# Patient Record
Sex: MALE | Race: OTHER | Employment: FULL TIME | URBAN - METROPOLITAN AREA
[De-identification: names, ages, dates, MRNs, and addresses within clinical notes are randomized per-mention and may not be internally consistent; named-entity substitution may affect disease eponyms.]

---

## 2018-10-17 ENCOUNTER — APPOINTMENT (OUTPATIENT)
Dept: CT IMAGING | Age: 43
End: 2018-10-17
Attending: EMERGENCY MEDICINE
Payer: SELF-PAY

## 2018-10-17 ENCOUNTER — HOSPITAL ENCOUNTER (EMERGENCY)
Age: 43
Discharge: HOME | End: 2018-10-18
Attending: EMERGENCY MEDICINE
Payer: SELF-PAY

## 2018-10-17 DIAGNOSIS — R10.9 ACUTE ABDOMINAL PAIN: Primary | ICD-10-CM

## 2018-10-17 DIAGNOSIS — K42.9 UMBILICAL HERNIA WITHOUT OBSTRUCTION AND WITHOUT GANGRENE: ICD-10-CM

## 2018-10-17 LAB
ALBUMIN SERPL-MCNC: 4 G/DL (ref 3.5–5)
ALBUMIN/GLOB SERPL: 1 {RATIO} (ref 1.2–3.5)
ALP SERPL-CCNC: 102 U/L (ref 50–136)
ALT SERPL-CCNC: 111 U/L (ref 12–65)
ANION GAP SERPL CALC-SCNC: 8 MMOL/L (ref 7–16)
AST SERPL-CCNC: 58 U/L (ref 15–37)
BASOPHILS # BLD: 0.1 K/UL (ref 0–0.2)
BASOPHILS NFR BLD: 1 % (ref 0–2)
BILIRUB SERPL-MCNC: 0.2 MG/DL (ref 0.2–1.1)
BUN SERPL-MCNC: 16 MG/DL (ref 6–23)
CALCIUM SERPL-MCNC: 8.7 MG/DL (ref 8.3–10.4)
CHLORIDE SERPL-SCNC: 108 MMOL/L (ref 98–107)
CO2 SERPL-SCNC: 27 MMOL/L (ref 21–32)
CREAT SERPL-MCNC: 1.07 MG/DL (ref 0.8–1.5)
DIFFERENTIAL METHOD BLD: ABNORMAL
EOSINOPHIL # BLD: 1.5 K/UL (ref 0–0.8)
EOSINOPHIL NFR BLD: 17 % (ref 0.5–7.8)
ERYTHROCYTE [DISTWIDTH] IN BLOOD BY AUTOMATED COUNT: 13 %
GLOBULIN SER CALC-MCNC: 4.1 G/DL (ref 2.3–3.5)
GLUCOSE SERPL-MCNC: 98 MG/DL (ref 65–100)
HCT VFR BLD AUTO: 45 % (ref 41.1–50.3)
HGB BLD-MCNC: 15 G/DL (ref 13.6–17.2)
IMM GRANULOCYTES # BLD: 0 K/UL (ref 0–0.5)
IMM GRANULOCYTES NFR BLD AUTO: 0 % (ref 0–5)
LYMPHOCYTES # BLD: 3.1 K/UL (ref 0.5–4.6)
LYMPHOCYTES NFR BLD: 34 % (ref 13–44)
MCH RBC QN AUTO: 29.2 PG (ref 26.1–32.9)
MCHC RBC AUTO-ENTMCNC: 33.3 G/DL (ref 31.4–35)
MCV RBC AUTO: 87.5 FL (ref 79.6–97.8)
MONOCYTES # BLD: 0.7 K/UL (ref 0.1–1.3)
MONOCYTES NFR BLD: 7 % (ref 4–12)
NEUTS SEG # BLD: 3.7 K/UL (ref 1.7–8.2)
NEUTS SEG NFR BLD: 41 % (ref 43–78)
NRBC # BLD: 0 K/UL (ref 0–0.2)
PLATELET # BLD AUTO: 188 K/UL (ref 150–450)
PMV BLD AUTO: 10.4 FL (ref 9.4–12.3)
POTASSIUM SERPL-SCNC: 4 MMOL/L (ref 3.5–5.1)
PROT SERPL-MCNC: 8.1 G/DL (ref 6.3–8.2)
RBC # BLD AUTO: 5.14 M/UL (ref 4.23–5.6)
SODIUM SERPL-SCNC: 143 MMOL/L (ref 136–145)
WBC # BLD AUTO: 9 K/UL (ref 4.3–11.1)

## 2018-10-17 PROCEDURE — 80053 COMPREHEN METABOLIC PANEL: CPT

## 2018-10-17 PROCEDURE — 85025 COMPLETE CBC W/AUTO DIFF WBC: CPT

## 2018-10-17 PROCEDURE — 96375 TX/PRO/DX INJ NEW DRUG ADDON: CPT | Performed by: EMERGENCY MEDICINE

## 2018-10-17 PROCEDURE — 74011250636 HC RX REV CODE- 250/636: Performed by: EMERGENCY MEDICINE

## 2018-10-17 PROCEDURE — 74177 CT ABD & PELVIS W/CONTRAST: CPT

## 2018-10-17 PROCEDURE — 99283 EMERGENCY DEPT VISIT LOW MDM: CPT | Performed by: EMERGENCY MEDICINE

## 2018-10-17 PROCEDURE — 96374 THER/PROPH/DIAG INJ IV PUSH: CPT | Performed by: EMERGENCY MEDICINE

## 2018-10-17 RX ORDER — MORPHINE SULFATE 4 MG/ML
4 INJECTION INTRAVENOUS
Status: COMPLETED | OUTPATIENT
Start: 2018-10-17 | End: 2018-10-17

## 2018-10-17 RX ORDER — SODIUM CHLORIDE 0.9 % (FLUSH) 0.9 %
10 SYRINGE (ML) INJECTION
Status: COMPLETED | OUTPATIENT
Start: 2018-10-17 | End: 2018-10-18

## 2018-10-17 RX ORDER — ONDANSETRON 2 MG/ML
4 INJECTION INTRAMUSCULAR; INTRAVENOUS
Status: COMPLETED | OUTPATIENT
Start: 2018-10-17 | End: 2018-10-17

## 2018-10-17 RX ADMIN — ONDANSETRON 4 MG: 2 INJECTION INTRAMUSCULAR; INTRAVENOUS at 23:56

## 2018-10-17 RX ADMIN — MORPHINE SULFATE 4 MG: 4 INJECTION INTRAVENOUS at 23:56

## 2018-10-18 VITALS
RESPIRATION RATE: 18 BRPM | DIASTOLIC BLOOD PRESSURE: 89 MMHG | BODY MASS INDEX: 37.2 KG/M2 | WEIGHT: 237 LBS | HEART RATE: 65 BPM | HEIGHT: 67 IN | TEMPERATURE: 98.2 F | SYSTOLIC BLOOD PRESSURE: 127 MMHG | OXYGEN SATURATION: 100 %

## 2018-10-18 PROCEDURE — 74011000258 HC RX REV CODE- 258: Performed by: EMERGENCY MEDICINE

## 2018-10-18 PROCEDURE — 74011636320 HC RX REV CODE- 636/320: Performed by: EMERGENCY MEDICINE

## 2018-10-18 PROCEDURE — 74011250637 HC RX REV CODE- 250/637

## 2018-10-18 PROCEDURE — 74011250637 HC RX REV CODE- 250/637: Performed by: EMERGENCY MEDICINE

## 2018-10-18 RX ORDER — NAPROXEN SODIUM 550 MG/1
550 TABLET ORAL 2 TIMES DAILY WITH MEALS
Qty: 10 TAB | Refills: 0 | Status: SHIPPED | OUTPATIENT
Start: 2018-10-18 | End: 2018-10-30

## 2018-10-18 RX ORDER — HYDROCODONE BITARTRATE AND ACETAMINOPHEN 10; 325 MG/1; MG/1
1 TABLET ORAL
Status: COMPLETED | OUTPATIENT
Start: 2018-10-18 | End: 2018-10-18

## 2018-10-18 RX ORDER — HYDROCODONE BITARTRATE AND ACETAMINOPHEN 10; 325 MG/1; MG/1
TABLET ORAL
Status: COMPLETED
Start: 2018-10-18 | End: 2018-10-18

## 2018-10-18 RX ADMIN — SODIUM CHLORIDE 100 ML: 900 INJECTION, SOLUTION INTRAVENOUS at 00:26

## 2018-10-18 RX ADMIN — IOPAMIDOL 100 ML: 755 INJECTION, SOLUTION INTRAVENOUS at 00:26

## 2018-10-18 RX ADMIN — HYDROCODONE BITARTRATE AND ACETAMINOPHEN 1 TABLET: 10; 325 TABLET ORAL at 01:21

## 2018-10-18 RX ADMIN — Medication 10 ML: at 00:26

## 2018-10-18 NOTE — ED TRIAGE NOTES
C/o pain located to umbilicus and lower abdomen. Onset approx 1 week pta. Denies n/v/d. Denies fever or chills. Denies urinary symptoms. States normal bm. Swelling noted to umbilicus. 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 Denies hx of same. Denies attempting pain meds pta.

## 2018-10-18 NOTE — ED NOTES
I have reviewed discharge instructions with the patient. The patient verbalized understanding. Patient left ED via Discharge Method: ambulatory to Home with wife. Opportunity for questions and clarification provided. Patient given 1 scripts. To continue your aftercare when you leave the hospital, you may receive an automated call from our care team to check in on how you are doing. This is a free service and part of our promise to provide the best care and service to meet your aftercare needs.  If you have questions, or wish to unsubscribe from this service please call [phone]. Thank you for Choosing our [facility] Emergency Department.

## 2018-10-18 NOTE — DISCHARGE INSTRUCTIONS
Call for appointment with primary care doctor. Recheck for persistent vomiting, fever or if the pain does not improve after 48 hours. Dolor abdominal: Instrucciones de cuidado - [ Abdominal Pain: Care Instructions ]  Instrucciones de cuidado    El dolor abdominal tiene muchas causas posibles. Algunas de ellas no son graves y mejoran por sí solas en unos días. Otras requieren Celina [place] y [place]. Si dean dolor continúa o [place], necesitará fernando nueva revisión y [place] pruebas para determinar qué pasa. Es posible que necesite cirugía para corregir el problema. No ignore nuevos síntomas, michelle fiebre, náuseas y [place], [address] [facility], dolor que JER[place] o [place]. Podrían ser señales de un problema más grave. Dean médico puede haberle recomendado fernando consulta de Shaheed Mendozan las 8 o 12 horas siguientes. Si no se siente mejor, es posible que requiera Celina [place] o [place]. El médico lo raymond revisado minuciosamente, paloma puede mitali problemas más tarde. Si nota algún problema o síntomas nuevos, busque tratamiento médico inmediatamente. La atención de seguimiento es fernando parte clave de dean tratamiento y seguridad. Asegúrese de hacer y acudir a todas las citas, y llame a dean médico si está teniendo problemas. También es fernando buena idea saber los resultados de veronica exámenes y mantener fernando lista de los medicamentos que mary. ¿Cómo puede cuidarse en el hogar? · Descanse hasta que se sienta mejor. · Para prevenir la deshidratación, tamiko abundantes líquidos, suficientes para que dean orina sea de color amarillo iban o transparente michelle el agua. Elija beber agua y otros líquidos reyes sin cafeína hasta que se sienta mejor. Si tiene [facility], del corazón o del hígado y tiene que [place] líquidos, hable con dean médico antes de aumentar dean consumo.   · Si tiene [employer], coma alimentos suaves, michelle arroz, pan mady seco o galletas saladas, bananas (plátanos) y puré de manzanas. Trate de comer varias comidas pequeñas al día en lugar de dos o morena grandes. · Espere hasta 48 horas después de que todos los síntomas hayan desaparecido antes de comer alimentos condimentados, alcohol y bebidas que contengan cafeína. · No consuma alimentos ricos en grasa. · Evite medicamentos antiinflamatorios michelle aspirina, ibuprofeno (Advil, Motrin) y naproxeno (Aleve). Pueden causar [employer]. Dígale a dean médico si está tomando aspirina diariamente debido a otro problema de ja. ¿Cuándo debe pedir ayuda? Llame al 911 en cualquier momento que considere que necesita atención de emergencia. Por ejemplo, llame si:    · Se desmayó (perdió el conocimiento).   · Las heces son de color rojizo o muy sanguinolentas (con juliann).   · Vomita juliann o algo parecido a granos de café molido.     · Tiene dolor abdominal nuevo e intenso.    Llame a dean médico ahora mismo o busque atención médica inmediata si:    · Dean dolor empeora, sobre todo si se concentra en fernando radha parte del vientre.     · Vuelve a tener fiebre o tiene fiebre más srinivas.     · Edith heces son negruzcas y parecidas al alquitrán o tienen rastros de juliann.     · Tiene sangrado vaginal inesperado.     · Tiene síntomas de fernando infección del tracto urinario. Estos podrían incluir:  ? Dolor al Marilyne Califon. ? Orinar con más frecuencia que lo habitual.  ? Juliann en la [facility].     · Siente mareos o aturdimiento, o que está a punto de desmayarse.    Preste especial atención a los cambios en dean ja y asegúrese de comunicarse con dean médico si:    · No está mejorando después de 1 día (24 horas). ¿Dónde puede encontrar más información en inglés? Patrick Crum a http://norman-elmer.info/. Seth Naknek O073 en la búsqueda para aprender más acerca de \"Dolor abdominal: Instrucciones de cuidado - [ Abdominal Pain: Care Instructions ]. \"  Revisado: 20 noviembre, 2017  Versión del contenido: 11.8  © 3539-6066 Healthwise, Incorporated.  Jessica Madden instrucciones de cuidado fueron adaptadas bajo licencia por [employer] (which disclaims liability or warranty for this information). Si usted tiene [place] afección médica o sobre estas instrucciones, siempre pregunte a dean profesional de ja. [facility] niega toda garantía o responsabilidad por dean uso de esta información. Hernia: Instrucciones de cuidado - [ Hernia: Care Instructions ]  Instrucciones de cuidado    Fernando hernia aparece cuando el tejido sobresale a través de fernando chasity débil en la pared de dean vientre. La chasity de la valencia y del ombligo son zonas comunes para fernando hernia. Fernando hernia también puede aparecer cerca de la chasity de fernando cirugía que tuvo anteriormente. La presión de levantar objetos, estirarse demasiado o toser puede desgarrar la chasity débil, lo que hace que la hernia sobresalga y cause dolor. Si no puede poner la hernia en dean sitio, el tejido podría quedar atrapado fuera de la pared del vientre. Si la hernia se tuerce y pierde dean aporte de juliann, se hinchará y morirá. A esto se le llama hernia estrangulada. Suele causar [employer]. Necesita tratamiento de inmediato. Algunas hernias necesitan repararse para prevenir que se estrangulen. Si dean hernia le causa síntomas o es [address], es posible que necesite [facility]. La atención de seguimiento es fernando parte clave de dean tratamiento y seguridad. Asegúrese de hacer y acudir a todas las citas, y llame a dean médico si está teniendo problemas. También es fernando buena idea saber los resultados de veronica exámenes y mantener fernando lista de los medicamentos que mary. ¿Cómo puede cuidarse en el hogar? · Tenga cuidado al levantar objetos pesados. · Mantenga un peso saludable. · No fume. Fumar puede provocar tos, lo cual puede hacer que sobresalga la hernia. Si necesita ayuda para dejar de fumar, hable con dean médico [employer] y medicamentos para dejar de fumar.  Éstos pueden aumentar veronica probabilidades de dejar el hábito para siempre. · Hable con dean médico antes de usar un corsé o un braguero para fernando hernia. No se recomiendan estos aparatos para tratar las hernias y a veces pueden hacer más daño que beneficio. Podrían mitali ciertos [facility] que dean médico piense que un braguero podría funcionar, paloma estos casos son poco comunes. ¿Cuándo debe pedir ayuda? Llame a dean médico ahora mismo o busque atención médica inmediata si:    · Tiene dolor repentino e intenso en la chasity de la hernia.     · Vomita o tiene náuseas.     · Tiene dolor abdominal y no está pasando gases ni heces.     · No puede empujar dean hernia hacia dean lugar con presión suave cuando se recuesta.     · La piel que cubre la hernia se enrojece o se pone sensible.    Preste especial atención a los cambios en dean ja y asegúrese de comunicarse con dean médico si:    · Tiene dolor nuevo o más intenso.     · No mejora michelle se esperaba. ¿Dónde puede encontrar más información en inglés? Macario Puentes a http://norman-elmer.info/. Escriba C129 en la búsqueda para aprender más acerca de \"Hernia: Instrucciones de cuidado - [ Hernia: Care Instructions ]. \"  Revisado: [address] [place], 2018  Versión del contenido: 11.8  © 5545-0960 Healthwise, Incorporated. Las instrucciones de cuidado fueron adaptadas bajo licencia por [employer] (which disclaims liability or warranty for this information). Si usted tiene [place] afección médica o sobre estas instrucciones, siempre pregunte a dean profesional de ja. Healthwise, Incorporated niega toda garantía o responsabilidad por dean uso de esta información.

## 2018-10-30 ENCOUNTER — APPOINTMENT (OUTPATIENT)
Dept: GENERAL RADIOLOGY | Age: 43
End: 2018-10-30
Attending: EMERGENCY MEDICINE
Payer: SELF-PAY

## 2018-10-30 ENCOUNTER — HOSPITAL ENCOUNTER (EMERGENCY)
Age: 43
Discharge: HOME | End: 2018-10-30
Attending: EMERGENCY MEDICINE
Payer: SELF-PAY

## 2018-10-30 VITALS
SYSTOLIC BLOOD PRESSURE: 138 MMHG | RESPIRATION RATE: 18 BRPM | TEMPERATURE: 98.2 F | OXYGEN SATURATION: 99 % | HEART RATE: 72 BPM | DIASTOLIC BLOOD PRESSURE: 88 MMHG

## 2018-10-30 DIAGNOSIS — S60.10XA SUBUNGUAL HEMATOMA OF FINGER, INITIAL ENCOUNTER: ICD-10-CM

## 2018-10-30 DIAGNOSIS — S61.219A LACERATION OF FINGER OF RIGHT HAND WITHOUT FOREIGN BODY WITHOUT DAMAGE TO NAIL, UNSPECIFIED FINGER, INITIAL ENCOUNTER: Primary | ICD-10-CM

## 2018-10-30 PROCEDURE — 90471 IMMUNIZATION ADMIN: CPT | Performed by: NURSE PRACTITIONER

## 2018-10-30 PROCEDURE — 75810000106 HC EVAC SUBUNGUAL HEMATOMA: Performed by: NURSE PRACTITIONER

## 2018-10-30 PROCEDURE — 77030002916 HC SUT ETHLN J&J -A

## 2018-10-30 PROCEDURE — 73130 X-RAY EXAM OF HAND: CPT

## 2018-10-30 PROCEDURE — 74011250637 HC RX REV CODE- 250/637: Performed by: NURSE PRACTITIONER

## 2018-10-30 PROCEDURE — 99283 EMERGENCY DEPT VISIT LOW MDM: CPT | Performed by: NURSE PRACTITIONER

## 2018-10-30 PROCEDURE — 90715 TDAP VACCINE 7 YRS/> IM: CPT | Performed by: NURSE PRACTITIONER

## 2018-10-30 PROCEDURE — 75810000293 HC SIMP/SUPERF WND  RPR: Performed by: NURSE PRACTITIONER

## 2018-10-30 PROCEDURE — 74011250636 HC RX REV CODE- 250/636: Performed by: NURSE PRACTITIONER

## 2018-10-30 RX ORDER — CEPHALEXIN 500 MG/1
500 CAPSULE ORAL 4 TIMES DAILY
Qty: 28 CAP | Refills: 0 | Status: SHIPPED | OUTPATIENT
Start: 2018-10-30 | End: 2018-11-06

## 2018-10-30 RX ORDER — TRAMADOL HYDROCHLORIDE 50 MG/1
50 TABLET ORAL
Qty: 8 TAB | Refills: 0 | Status: SHIPPED | OUTPATIENT
Start: 2018-10-30

## 2018-10-30 RX ORDER — IBUPROFEN 800 MG/1
800 TABLET ORAL
Status: COMPLETED | OUTPATIENT
Start: 2018-10-30 | End: 2018-10-30

## 2018-10-30 RX ADMIN — TETANUS TOXOID, REDUCED DIPHTHERIA TOXOID AND ACELLULAR PERTUSSIS VACCINE, ADSORBED 0.5 ML: 5; 2.5; 8; 8; 2.5 SUSPENSION INTRAMUSCULAR at 15:08

## 2018-10-30 RX ADMIN — IBUPROFEN 800 MG: 800 TABLET, FILM COATED ORAL at 16:46

## 2018-10-30 NOTE — ED PROVIDER NOTES
Patient states he right middle and right index finger today while working with a machine. He denies numbness to his fingers. He states unknown last tetanus. The history is provided by the patient. Laceration The incident occurred 1 to 2 hours ago. The laceration is located on the right hand. The laceration is 3 cm in size. The injury mechanism is a metal edge. Foreign body present: no. The pain is at a severity of 8/10. The pain is moderate. The pain has been constant since onset. Pertinent negatives include no numbness, no tingling, no weakness, no loss of motion, no coolness and no discoloration. It is unknown when the patient last had a tetanus shot. History reviewed. No pertinent past medical history. History reviewed. No pertinent surgical history. History reviewed. No pertinent family history. Social History Socioeconomic History  Marital status:  Spouse name: Not on file  Number of children: Not on file  Years of education: Not on file  Highest education level: Not on file Social Needs  Financial resource strain: Not on file  Food insecurity - worry: Not on file  Food insecurity - inability: Not on file  Transportation needs - medical: Not on file  Transportation needs - non-medical: Not on file Occupational History  Not on file Tobacco Use  Smoking status: Not on file Substance and Sexual Activity  Alcohol use: Not on file  Drug use: Not on file  Sexual activity: Not on file Other Topics Concern  Not on file Social History Narrative  Not on file ALLERGIES: Patient has no known allergies. Review of Systems Skin: Positive for wound. Neurological: Negative for tingling, weakness and numbness. Vitals:  
 10/30/18 1453 BP: (!) 144/103 Pulse: 68 Resp: 18 Temp: 98.2 °F (36.8 °C) SpO2: 98% Physical Exam  
 Constitutional: He is oriented to person, place, and time. He appears well-developed and well-nourished. No distress. Cardiovascular: Normal rate and regular rhythm. Pulmonary/Chest: Effort normal and breath sounds normal.  
Musculoskeletal:  
     Right hand: He exhibits tenderness and laceration. He exhibits normal range of motion and normal capillary refill. Normal sensation noted. Normal strength noted. Hands: 
Subungual hematoma noted to right index Neurological: He is alert and oriented to person, place, and time. Skin: Laceration noted. He is not diaphoretic. Nursing note and vitals reviewed. Xr Hand Rt Min 3 V Result Date: 10/30/2018 Right hand CLINICAL INDICATION: Laceration to the right second and third fingers today FINDINGS: Three views of the right hand show no fracture or dislocation. No retained radiopaque foreign body identified. Soft tissues are unremarkable. IMPRESSION: No acute osseous abnormality or retained radiopaque foreign body evident. MDM Number of Diagnoses or Management Options Laceration of finger of right hand without foreign body without damage to nail, unspecified finger, initial encounter:  
Subungual hematoma of finger, initial encounter:  
Diagnosis management comments: Wounds soaked in betadine and saline for 20 minutes. Xray negative for acute fracture and retained foreign bodies. Wounds repaired and subungual hematoma drained using bovie. Dressing applied and patient given prescriptions for keflex and tramadol. Patient given tetanus prior to discharge Amount and/or Complexity of Data Reviewed Tests in the radiology section of CPT®: ordered and reviewed Tests in the medicine section of CPT®: ordered Patient Progress Patient progress: stable Wound Repair 
Date/Time: 10/30/2018 4:56 PM 
Performed by: Shavonne Perez provider: Luci Soto Preparation: skin prepped with Betadine Pre-procedure re-eval: Immediately prior to the procedure, the patient was reevaluated and found suitable for the planned procedure and any planned medications. Time out: Immediately prior to the procedure a time out was called to verify the correct patient, procedure, equipment, staff and marking as appropriate. Kira Rodriguez Location details: right index finger and right long finger Wound length:2.5 cm or less Anesthesia: digital block Anesthesia: 
Local Anesthetic: lidocaine 1% without epinephrine Anesthetic total: 2 mL Foreign bodies: no foreign bodies Irrigation solution: saline Irrigation method: syringe Debridement: none Skin closure: 4-0 nylon Number of sutures: 8 Technique: simple Approximation: close Dressing: xeroform and gauze. Patient tolerance: Patient tolerated the procedure well with no immediate complications My total time at bedside, performing this procedure was 1-15 minutes. Comments: Wounds irrigated with 100ml of normal saline.

## 2018-10-30 NOTE — ED NOTES
I have reviewed discharge instructions with the patient. The patient verbalized understanding. Patient left ED via Discharge Method: ambulatory to Home with self. Opportunity for questions and clarification provided. Patient given 2 scripts. To continue your aftercare when you leave the hospital, you may receive an automated call from our care team to check in on how you are doing. This is a free service and part of our promise to provide the best care and service to meet your aftercare needs.  If you have questions, or wish to unsubscribe from this service please call [phone]. Thank you for Choosing our [facility] Emergency Department.

## 2018-10-30 NOTE — DISCHARGE INSTRUCTIONS
Change your dressings every day. Antibiotic ointment to the area  Keflex as prescribed and tramadol for pain  Return in 7 days for suture removal.  Sooner if you start having drainage from the area, develop a fever, or have any concerning symptoms.

## 2018-10-30 NOTE — ED TRIAGE NOTES
Pt cut his right middle finger or right index finger on a machine at work today. Pt unsure of when last tetanus shot was. Bleeding controlled in triage

## 2018-11-13 PROBLEM — K42.9 UMBILICAL HERNIA WITHOUT OBSTRUCTION AND WITHOUT GANGRENE: Status: ACTIVE | Noted: 2018-11-13

## 2019-04-09 NOTE — ED PROVIDER NOTES
72-year-old male was sent for abdominal pain has been intermittent for a week. It got much worse today. No fever vomiting diarrhea dysuria. States he noticed some swelling of the umbilicus. No history of abdominal surgery. The history is provided by the patient. A  was used. Abdominal Pain This is a new problem. The current episode started more than 2 days ago. The problem occurs daily. The problem has been rapidly worsening. The pain is located in the periumbilical region and RLQ. The pain is moderate. Pertinent negatives include no diarrhea, no nausea, no vomiting, no constipation, no dysuria, no hematuria and no back pain. Nothing worsens the pain. The pain is relieved by nothing. His past medical history does not include pancreatitis or diverticulitis. The patient's surgical history non-contributory. No past medical history on file. No past surgical history on file. No family history on file. Social History Socioeconomic History  Marital status:  Spouse name: Not on file  Number of children: Not on file  Years of education: Not on file  Highest education level: Not on file Social Needs  Financial resource strain: Not on file  Food insecurity - worry: Not on file  Food insecurity - inability: Not on file  Transportation needs - medical: Not on file  Transportation needs - non-medical: Not on file Occupational History  Not on file Tobacco Use  Smoking status: Not on file Substance and Sexual Activity  Alcohol use: Not on file  Drug use: Not on file  Sexual activity: Not on file Other Topics Concern  Not on file Social History Narrative  Not on file ALLERGIES: Patient has no known allergies. Review of Systems Gastrointestinal: Positive for abdominal pain. Negative for constipation, diarrhea, nausea and vomiting. Genitourinary: Negative for dysuria and hematuria. See Moderate Sedation Record placed in chart. Musculoskeletal: Negative for back pain. All other systems reviewed and are negative. Vitals:  
 10/17/18 2144 BP: (!) 142/93 Pulse: 64 Resp: 20 Temp: 98.3 °F (36.8 °C) SpO2: 100% Weight: 107.5 kg (237 lb) Height: 5' 7\" (1.702 m) Physical Exam  
Constitutional: He is oriented to person, place, and time. He appears well-developed and well-nourished. HENT:  
Head: Normocephalic and atraumatic. Right Ear: External ear normal.  
Left Ear: External ear normal.  
Mouth/Throat: Oropharynx is clear and moist.  
Eyes: Conjunctivae and EOM are normal. Pupils are equal, round, and reactive to light. Neck: Normal range of motion. Neck supple. Cardiovascular: Normal rate, regular rhythm and normal heart sounds. Pulmonary/Chest: Effort normal and breath sounds normal.  
Abdominal: Soft. Bowel sounds are normal. He exhibits no distension and no mass. There is tenderness in the right lower quadrant and periumbilical area. There is no rebound. Fundus at the umbilicus that seems to reduce with pressure. Perhaps small hernia Neurological: He is alert and oriented to person, place, and time. Skin: Skin is warm and dry. He is not diaphoretic. Nursing note and vitals reviewed. MDM Number of Diagnoses or Management Options Diagnosis management comments: Possibility of small incarcerated umbilical hernia. Less likely to be appendicitis. CT scan. Amount and/or Complexity of Data Reviewed Clinical lab tests: ordered and reviewed Tests in the medicine section of CPT®: ordered and reviewed Risk of Complications, Morbidity, and/or Mortality Presenting problems: moderate Diagnostic procedures: low Management options: moderate Patient Progress Patient progress: stable Procedures Results Include: 
 
Recent Results (from the past 24 hour(s)) CBC WITH AUTOMATED DIFF Collection Time: 10/17/18  9:47 PM  
Result Value Ref Range WBC 9.0 4.3 - 11.1 K/uL RBC 5.14 4.23 - 5.6 M/uL  
 HGB 15.0 13.6 - 17.2 g/dL HCT 45.0 41.1 - 50.3 % MCV 87.5 79.6 - 97.8 FL  
 MCH 29.2 26.1 - 32.9 PG  
 MCHC 33.3 31.4 - 35.0 g/dL  
 RDW 13.0 % PLATELET 176 373 - 314 K/uL MPV 10.4 9.4 - 12.3 FL ABSOLUTE NRBC 0.00 0.0 - 0.2 K/uL  
 DF AUTOMATED NEUTROPHILS 41 (L) 43 - 78 % LYMPHOCYTES 34 13 - 44 % MONOCYTES 7 4.0 - 12.0 % EOSINOPHILS 17 (H) 0.5 - 7.8 % BASOPHILS 1 0.0 - 2.0 % IMMATURE GRANULOCYTES 0 0.0 - 5.0 %  
 ABS. NEUTROPHILS 3.7 1.7 - 8.2 K/UL  
 ABS. LYMPHOCYTES 3.1 0.5 - 4.6 K/UL  
 ABS. MONOCYTES 0.7 0.1 - 1.3 K/UL  
 ABS. EOSINOPHILS 1.5 (H) 0.0 - 0.8 K/UL  
 ABS. BASOPHILS 0.1 0.0 - 0.2 K/UL  
 ABS. IMM. GRANS. 0.0 0.0 - 0.5 K/UL METABOLIC PANEL, COMPREHENSIVE Collection Time: 10/17/18  9:47 PM  
Result Value Ref Range Sodium 143 136 - 145 mmol/L Potassium 4.0 3.5 - 5.1 mmol/L Chloride 108 (H) 98 - 107 mmol/L  
 CO2 27 21 - 32 mmol/L Anion gap 8 7 - 16 mmol/L Glucose 98 65 - 100 mg/dL BUN 16 6 - 23 MG/DL Creatinine 1.07 0.8 - 1.5 MG/DL  
 GFR est AA >60 >60 ml/min/1.73m2 GFR est non-AA >60 >60 ml/min/1.73m2 Calcium 8.7 8.3 - 10.4 MG/DL Bilirubin, total 0.2 0.2 - 1.1 MG/DL  
 ALT (SGPT) 111 (H) 12 - 65 U/L  
 AST (SGOT) 58 (H) 15 - 37 U/L Alk. phosphatase 102 50 - 136 U/L Protein, total 8.1 6.3 - 8.2 g/dL Albumin 4.0 3.5 - 5.0 g/dL Globulin 4.1 (H) 2.3 - 3.5 g/dL A-G Ratio 1.0 (L) 1.2 - 3.5 Ct Abd Pelv W Cont Result Date: 10/18/2018 CT ABDOMEN AND PELVIS WITH CONTRAST HISTORY: Periumbilical abdominal pain COMPARISON: None TECHNIQUE: Helical imaging was performed from the lung bases through the ischial tuberosities during the intravenous infusion of 100 cc of Isovue-370. Oral contrast was not administered. Coronal and sagittal reformats were performed. Dose reduction techniques used:  Automated exposure control, adjustment of the mAs and/or kVp according to patient's size, and iterative reconstruction techniques. FINDINGS: *  LUNG BASES: Within normal limits. *  LIVER: Diffuse fatty change. *  GALLBLADDER AND BILE DUCTS: Normal. *  SPLEEN: Within normal limits. *  URINARY TRACT: Within normal limits. *  ADRENALS: Within normal limits. *  PANCREAS: Within normal limits. *  GASTROINTESTINAL TRACT: Normal appendix. *  RETROPERITONEUM: Within normal limits. *  PERITONEAL CAVITY AND ABDOMINAL WALL: Small uncomplicated periumbilical hernia. *  PELVIS: Within normal limits. *  SPINE / BONES: Within normal limits. *  OTHER COMMENTS: None. IMPRESSION: Small uncomplicated periumbilical hernia. Diffuse fatty change of the liver. Date of Dictation: 10/18/2018 12:42 AM  
 
 
 
No evidence for incarceration. Will have patient follow with primary care doctor. If symptoms persist, referral to her surgeon